# Patient Record
Sex: FEMALE | Race: WHITE | URBAN - METROPOLITAN AREA
[De-identification: names, ages, dates, MRNs, and addresses within clinical notes are randomized per-mention and may not be internally consistent; named-entity substitution may affect disease eponyms.]

---

## 2020-07-31 ENCOUNTER — HISTORICAL (OUTPATIENT)
Dept: RADIOLOGY | Facility: HOSPITAL | Age: 50
End: 2020-07-31

## 2022-04-18 ENCOUNTER — HISTORICAL (OUTPATIENT)
Dept: RADIOLOGY | Facility: HOSPITAL | Age: 52
End: 2022-04-18

## 2024-10-17 ENCOUNTER — OFFICE VISIT (OUTPATIENT)
Dept: ORTHOPEDICS | Facility: CLINIC | Age: 54
End: 2024-10-17
Payer: COMMERCIAL

## 2024-10-17 ENCOUNTER — HOSPITAL ENCOUNTER (OUTPATIENT)
Dept: RADIOLOGY | Facility: CLINIC | Age: 54
Discharge: HOME | End: 2024-10-17
Attending: ORTHOPAEDIC SURGERY
Payer: COMMERCIAL

## 2024-10-17 VITALS
WEIGHT: 163 LBS | BODY MASS INDEX: 27.16 KG/M2 | SYSTOLIC BLOOD PRESSURE: 110 MMHG | HEIGHT: 65 IN | DIASTOLIC BLOOD PRESSURE: 77 MMHG | HEART RATE: 77 BPM

## 2024-10-17 DIAGNOSIS — M70.61 TROCHANTERIC BURSITIS OF BOTH HIPS: ICD-10-CM

## 2024-10-17 DIAGNOSIS — M25.559 HIP PAIN, UNSPECIFIED LATERALITY: Primary | ICD-10-CM

## 2024-10-17 DIAGNOSIS — M54.16 LUMBAR RADICULOPATHY, CHRONIC: ICD-10-CM

## 2024-10-17 DIAGNOSIS — M70.62 TROCHANTERIC BURSITIS OF BOTH HIPS: ICD-10-CM

## 2024-10-17 DIAGNOSIS — M25.559 HIP PAIN, UNSPECIFIED LATERALITY: ICD-10-CM

## 2024-10-17 RX ORDER — VALACYCLOVIR HYDROCHLORIDE 500 MG/1
500 TABLET, FILM COATED ORAL DAILY PRN
COMMUNITY

## 2024-10-17 RX ORDER — ATOMOXETINE 60 MG/1
60 CAPSULE ORAL EVERY MORNING
COMMUNITY
Start: 2024-08-01

## 2024-10-17 RX ORDER — SERTRALINE HCL 100 MG
TABLET ORAL
COMMUNITY

## 2024-10-17 RX ORDER — ACYCLOVIR 50 MG/G
OINTMENT TOPICAL
COMMUNITY
Start: 2024-07-25

## 2024-10-17 RX ORDER — NITROFURANTOIN 25; 75 MG/1; MG/1
100 CAPSULE ORAL 2 TIMES DAILY
COMMUNITY
Start: 2024-09-24

## 2024-10-17 RX ORDER — TRAZODONE HYDROCHLORIDE 50 MG/1
50 TABLET ORAL NIGHTLY
COMMUNITY
Start: 2024-07-25

## 2024-10-17 NOTE — PROGRESS NOTES
"  Chief Complaint:   Chief Complaint   Patient presents with    Right Hip - Pain     Pt states she has been experiencing bilateral hip pain mainly at night. She can't sleep on her sides anymore because it feels like something is "eating her bones'. Pt also report burning pain and discomfort while walking. Pt tried ointments w/o relief.        History of present illness:    History of Present Illness  The patient presents for evaluation of bilateral hip pain.    She reports experiencing pain in both hips, predominantly on the sides, with no discomfort in the groin or upper buttocks. The pain occasionally radiates down her legs, extending to her ankles, and is more pronounced on the left side. The pain intensifies at night, disrupting her sleep when she lies on her side. Prolonged sitting also triggers a throbbing sensation. Stretching appears to alleviate the discomfort.    She has been using magnesium cream and Advil for relief. Recently, she started using a body vibration machine, which seems to be beneficial.    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 She has a history of osteoporosis and underwent an MRI a few years ago.    FAMILY HISTORY  Her first cousin had bone cancer.    History reviewed. No pertinent past medical history.    History reviewed. No pertinent surgical history.    Current Outpatient Medications   Medication Sig    acyclovir 5% (ZOVIRAX) 5 % ointment Apply topically.    atomoxetine (STRATTERA) 60 MG capsule Take 60 mg by mouth every morning.    nitrofurantoin, macrocrystal-monohydrate, (MACROBID) 100 MG capsule Take 100 mg by mouth 2 (two) times daily.    traZODone (DESYREL) 50 MG tablet Take 50 mg by mouth every evening.    valACYclovir (VALTREX) 500 MG tablet Take 500 mg by mouth daily as needed.    ZOLOFT 100 mg tablet      No current facility-administered medications for this visit.       Review of patient's allergies indicates:   Allergen " Reactions    Egg        Family History   Family history unknown: Yes       Social History     Socioeconomic History    Marital status:    Tobacco Use    Smoking status: Never    Smokeless tobacco: Never   Substance and Sexual Activity    Alcohol use: Yes     Comment: Rarely    Drug use: Never    Sexual activity: Yes     Partners: Male           Review of Systems:    Constitution: Negative for chills, fever, and sweats.  Negative for unexplained weight loss.    HENT:  Negative for headaches and blurry vision.    Cardiovascular:Negative for chest pain or irregular heart beat. Negative for hypertension.    Respiratory:  Negative for cough and shortness of breath.    Gastrointestinal: Negative for abdominal pain, heartburn, melena, nausea, and vomitting.    Genitourinary:  Negative bladder incontinence and dysuria.    Musculoskeletal:  See HPI    Neurological: Negative for numbness.    Psychiatric/Behavioral: Negative for depression.  The patient is not nervous/anxious.      Endocrine: Negative for polyuria    Hematologic/Lymphatic: Negative for bleeding problem.  Does not bruise/bleed easily.    Skin: Negative for poor would healing and rash      Physical Examination:    Vital Signs:    Vitals:    10/17/24 1331   BP: 110/77   Pulse: 77       Body mass index is 27.12 kg/m².    General: No acute distress, alert and oriented, healthy appearing    HEENT: Head is atraumatic, mucous membranes are moist    Neck: Supples, no JVD    Cardiovascular: Palpable dorsalis pedis and posterior tibial pulses, regular rate and rhythm to those pulses    Lungs: Breathing non-labored    Skin: no rashes appreciated    Neurologic: Can flex and extend knees, ankles, and toes. Sensation is grossly intact    Bilateral hips:  Range of motion of both hips without significant pain.  Patient was some mild point tenderness over her greater trochanter.  Brisk cap refill disappeared sensation intact distally.  Patient was negative straight leg  raise today.    X-rays:  Three views of bilateral knees reviewed.  Patient without significant arthritic change to both hips.  Well-maintained joint space.     Assessment::  Lumbar radiculopathy  Trochanteric bursitis    Plan:  Symptoms majority of it was coming from her low back.  We will get her into physical therapy she has been calm this down as well as her trochanteric bursitis.  See her back in 6-8 weeks.  Could try an injection in the future if necessary.  It was next up for her back if failed physical therapy we MRI of her lumbar spine.    This note was generated with the assistance of ambient listening technology. Verbal consent was obtained by the patient and accompanying visitor(s) for the recording of patient appointment to facilitate this note. I attest to having reviewed and edited the generated note for accuracy, though some syntax or spelling errors may persist. Please contact the author of this note for any clarification.      This note was created using [employer] voice recognition software that occasionally misinterpreted phrases or words.    Consult note is delivered via Epic messaging service.

## 2024-11-25 ENCOUNTER — TELEPHONE (OUTPATIENT)
Dept: ORTHOPEDICS | Facility: CLINIC | Age: 54
End: 2024-11-25
Payer: COMMERCIAL

## 2024-11-25 NOTE — TELEPHONE ENCOUNTER
Left message about appointment change from 12/10/24 to 12/04/24 at 9:30. Call back at her convince.